# Patient Record
Sex: FEMALE
[De-identification: names, ages, dates, MRNs, and addresses within clinical notes are randomized per-mention and may not be internally consistent; named-entity substitution may affect disease eponyms.]

---

## 2021-02-15 ENCOUNTER — HOSPITAL ENCOUNTER (OUTPATIENT)
Dept: HOSPITAL 72 - PAN | Age: 71
Discharge: HOME | End: 2021-02-15
Payer: MEDICAID

## 2021-02-15 VITALS — DIASTOLIC BLOOD PRESSURE: 68 MMHG | SYSTOLIC BLOOD PRESSURE: 132 MMHG

## 2021-02-15 VITALS — HEIGHT: 62 IN | BODY MASS INDEX: 28.16 KG/M2 | WEIGHT: 153 LBS

## 2021-02-15 DIAGNOSIS — K21.9: ICD-10-CM

## 2021-02-15 DIAGNOSIS — F32.9: ICD-10-CM

## 2021-02-15 DIAGNOSIS — E78.00: ICD-10-CM

## 2021-02-15 DIAGNOSIS — I10: ICD-10-CM

## 2021-02-15 DIAGNOSIS — Z88.8: ICD-10-CM

## 2021-02-15 DIAGNOSIS — M81.0: ICD-10-CM

## 2021-02-15 DIAGNOSIS — F41.9: ICD-10-CM

## 2021-02-15 DIAGNOSIS — R10.9: Primary | ICD-10-CM

## 2021-02-15 DIAGNOSIS — R19.7: ICD-10-CM

## 2021-02-15 DIAGNOSIS — E03.9: ICD-10-CM

## 2021-02-15 PROCEDURE — 99203 OFFICE O/P NEW LOW 30 MIN: CPT

## 2021-02-16 NOTE — CONSULTATION
DATE OF CONSULTATION:  02/15/2021

GASTROENTEROLOGY CONSULTATION



CONSULTING PHYSICIAN:  Riaz Rogers MD.



CHIEF COMPLAINT:  Referral for screening colonoscopy, complaint of

abdominal pain, GERD, and diarrhea.



PAST MEDICAL HISTORY:  History of lactose intolerance, IBS, osteoporosis,

anxiety and depression, hypercholesteremia, hypothyroidism, _____

obstruction, hypertension, fatty liver.



PAST SURGICAL HISTORY:  Appendectomy, bilateral mastectomy.



MEDICATIONS:  See medication reconciliation list.



FAMILY HISTORY:  Mother had breast cancer.  Sister had breast

cancer.



SOCIAL HISTORY:  The patient drinks alcohol occasionally.  Denies any

tobacco or drug abuse.



ALLERGIES:  Cyclobenzaprine, trazodone.



REVIEW OF SYSTEMS:  Positive for abdominal pain, GERD, diarrhea, anxiety.



PHYSICAL EXAMINATION:

VITAL SIGNS:  Temperature 97.1, blood pressure 132/68, pulse _____,

respirations 20.

HEENT:  Normocephalic and atraumatic.  Sclerae are anicteric.

NECK:  Supple.  No evidence of obvious lymphadenopathy.

CARDIOVASCULAR:  Regular rate and rhythm.  Plus S1, S2.

LUNGS:  Decreased breath sounds bilaterally based on supine exam.

ABDOMEN:  Soft, nontender.  No rebound.  No guarding.  No peritoneal

sign.

EXTREMITIES:  No cyanosis, no clubbing, no edema.



ASSESSMENT AND PLAN:  This is a 71-year-old female who has diarrhea, also

needs a screening colonoscopy, has also chronic GERD.  Plan to schedule

her for endoscopy and colonoscopy.









  ______________________________________________

  Riaz Rogers M.D.





DR:  YANG

D:  02/16/2021 16:11

T:  02/16/2021 16:24

JOB#:  [id number]

CC: